# Patient Record
Sex: FEMALE | URBAN - METROPOLITAN AREA
[De-identification: names, ages, dates, MRNs, and addresses within clinical notes are randomized per-mention and may not be internally consistent; named-entity substitution may affect disease eponyms.]

---

## 2018-08-20 ENCOUNTER — IMPORTED ENCOUNTER (OUTPATIENT)
Dept: URBAN - METROPOLITAN AREA CLINIC 1 | Facility: CLINIC | Age: 33
End: 2018-08-20

## 2018-08-20 PROBLEM — H52.13: Noted: 2018-08-20

## 2018-08-20 PROCEDURE — S0620 ROUTINE OPHTHALMOLOGICAL EXA: HCPCS

## 2018-08-20 NOTE — PATIENT DISCUSSION
Myopia: Rx was given for correction if indicated and requested. CC- Trials given to pt today. [id number] Niki Jones for pt to call if desires to finalize CTL RX. Return for an appointment for CC in  1 week with Dr. Jenny Valente.

## 2019-01-29 NOTE — PROCEDURE NOTE: SURGICAL
<p>Prior to commencing surgery patient identification, surgical procedure, site, and side were confirmed by Dr. Cabrera.&nbsp; Following topical proparacaine anesthesia, the patient was positioned at the YAG laser, a contact lens coupled to the cornea of the right eye with methylcellulose and an axial posterior capsulotomy performed without complication using 3.8 Mj x 45. Attention was then turned to the left eye and a contact lens coupled to the cornea of the left eye with methylcellulose and an axial posterior capsulotomy performed without complication using 3.8 Mj x 38. One drop of Alphagan was instilled in both eyes and the patient returned to the holding area having tolerated the procedure well and without complication. </p><p>MRN [id number]</p>

## 2022-04-02 ASSESSMENT — TONOMETRY
OS_IOP_MMHG: 19
OD_IOP_MMHG: 19

## 2022-04-02 ASSESSMENT — KERATOMETRY
OD_AXISANGLE_DEGREES: 170
OD_K1POWER_DIOPTERS: 44.75
OS_AXISANGLE2_DEGREES: 090
OS_AXISANGLE_DEGREES: 180
OD_K2POWER_DIOPTERS: 46.25
OS_K2POWER_DIOPTERS: 44.50
OD_AXISANGLE2_DEGREES: 080
OS_K1POWER_DIOPTERS: 44.25

## 2022-04-02 ASSESSMENT — VISUAL ACUITY
OD_SC: 20/20
OS_SC: 20/20